# Patient Record
Sex: MALE | Race: WHITE | Employment: OTHER | URBAN - METROPOLITAN AREA
[De-identification: names, ages, dates, MRNs, and addresses within clinical notes are randomized per-mention and may not be internally consistent; named-entity substitution may affect disease eponyms.]

---

## 2020-02-06 NOTE — TELEPHONE ENCOUNTER
DIAGNOSIS: Dupuytren's Contracture, in both feet / appt per pt / all recs [facility]   APPOINTMENT DATE: 3/10/2020   NOTES STATUS DETAILS   OFFICE NOTE from referring provider Care Everywhere 01/31/2020 referral from Prabhjot Najera DO     OFFICE NOTE from other specialist Care Everywhere 08/08/2017 PT notes with Jonathan Tapia, PT     DISCHARGE SUMMARY from hospital N/A    DISCHARGE REPORT from the ER N/A    OPERATIVE REPORT N/A    MEDICATION LIST Care Everywhere    IMPLANT RECORD/STICKER N/A    LABS     CBC/DIFF N/A    CULTURES N/A    INJECTIONS DONE IN RADIOLOGY N/A    MRI N/A    CT SCAN N/A    XRAYS (IMAGES & REPORTS) N/A    TUMOR     PATHOLOGY  Slides & report N/A

## 2020-02-10 ENCOUNTER — DOCUMENTATION ONLY (OUTPATIENT)
Dept: CARE COORDINATION | Facility: CLINIC | Age: 75
End: 2020-02-10

## 2020-03-10 ENCOUNTER — PRE VISIT (OUTPATIENT)
Dept: ORTHOPEDICS | Facility: CLINIC | Age: 75
End: 2020-03-10

## 2020-03-10 ENCOUNTER — OFFICE VISIT (OUTPATIENT)
Dept: ORTHOPEDICS | Facility: CLINIC | Age: 75
End: 2020-03-10
Payer: MEDICARE

## 2020-03-10 ENCOUNTER — ANCILLARY PROCEDURE (OUTPATIENT)
Dept: GENERAL RADIOLOGY | Facility: CLINIC | Age: 75
End: 2020-03-10
Attending: PREVENTIVE MEDICINE
Payer: MEDICARE

## 2020-03-10 VITALS — DIASTOLIC BLOOD PRESSURE: 67 MMHG | SYSTOLIC BLOOD PRESSURE: 114 MMHG | HEART RATE: 65 BPM | OXYGEN SATURATION: 94 %

## 2020-03-10 DIAGNOSIS — M51.369 DDD (DEGENERATIVE DISC DISEASE), LUMBAR: ICD-10-CM

## 2020-03-10 DIAGNOSIS — M79.672 BILATERAL FOOT PAIN: Primary | ICD-10-CM

## 2020-03-10 DIAGNOSIS — M79.671 BILATERAL FOOT PAIN: Primary | ICD-10-CM

## 2020-03-10 DIAGNOSIS — M72.0 DUPUYTREN'S CONTRACTURE OF RIGHT HAND: ICD-10-CM

## 2020-03-10 DIAGNOSIS — M79.672 BILATERAL FOOT PAIN: ICD-10-CM

## 2020-03-10 DIAGNOSIS — M72.2 DUPUYTREN'S CONTRACTURE OF FOOT: ICD-10-CM

## 2020-03-10 DIAGNOSIS — M79.671 BILATERAL FOOT PAIN: ICD-10-CM

## 2020-03-10 RX ORDER — CLOPIDOGREL BISULFATE 75 MG/1
75 TABLET ORAL
COMMUNITY
Start: 2019-07-19

## 2020-03-10 RX ORDER — CYCLOBENZAPRINE HCL 10 MG
10 TABLET ORAL
COMMUNITY
Start: 2019-07-19

## 2020-03-10 RX ORDER — HYDROCODONE BITARTRATE AND ACETAMINOPHEN 5; 325 MG/1; MG/1
1 TABLET ORAL
COMMUNITY
Start: 2020-01-31

## 2020-03-10 RX ORDER — ATORVASTATIN CALCIUM 80 MG/1
80 TABLET, FILM COATED ORAL
COMMUNITY
Start: 2019-07-19

## 2020-03-10 RX ORDER — SODIUM FLUORIDE 6 MG/ML
PASTE, DENTIFRICE DENTAL
COMMUNITY
Start: 2020-02-01

## 2020-03-10 ASSESSMENT — ENCOUNTER SYMPTOMS
ARTHRALGIAS: 1
BACK PAIN: 1
STIFFNESS: 1
MYALGIAS: 1

## 2020-03-10 NOTE — LETTER
3/10/2020       RE: Layo Barboza  [address] [place] [address]     Dear Colleague,    Thank you for referring your patient, Layo Barboza, to the [facility] at [facility]. Please see a copy of my visit note below.    HISTORY OF PRESENT ILLNESS  Mr. Barboza is a pleasant 74 year old year old male who presents to clinic today with bilateral hand and foot contractures. He is concnered about contractures in his right hand, and bilateral feet  Have had for years  Also reports history of lumbar disc herniation  Has back pain regularly as well  And pain and numbness in both feet  Layo explains that he is mostly interested in seeing Dr Melvin for care and possible surgery of right hand but is interested to discuss his low back pain and symptoms in his feet  Location: see above  Quality:  achy pain    Severity: 5/10 at worst    Duration: years  Timing: occurs intermittently  Context: occurs while using his hand and walking   Modifying factors:  resting and non-use makes it better, movement and use makes it worse  Associated signs & symptoms: see above  Additional history: as documented    MEDICAL HISTORY  There is no problem list on file for this patient.      Current Outpatient Medications   Medication Sig Dispense Refill     atorvastatin (LIPITOR) 80 MG tablet Take 80 mg by mouth       clopidogrel (PLAVIX) 75 MG tablet Take 75 mg by mouth       cyclobenzaprine (FLEXERIL) 10 MG tablet Take 10 mg by mouth       HYDROcodone-acetaminophen (NORCO) 5-325 MG tablet Take 1 tablet by mouth       PREVIDENT 5000 BOOSTER PLUS 1.1 % PSTE USE IN PLACE OF REGULAR PASTE HS . BRUSH NORMALLY THEN SWISH WITH FOAM FOR 30 SECONDS. SPIT OUT THEN NO FOOD OR DRINK FOR 30 MINUTES         No Known Allergies    History reviewed. No pertinent family history.  Social History     Socioeconomic History     Marital status:      Spouse name: None     Number of  children: None     Years of education: None     Highest education level: None   Occupational History     None   Social Needs     Financial resource strain: None     Food insecurity     Worry: None     Inability: None     Transportation needs     Medical: None     Non-medical: None   Tobacco Use     Smoking status: Former Smoker     Packs/day: 1.50     Years: 10.00     Pack years: 15.00     Types: Cigarettes, Dip, chew, snus or snuff     Start date: 1/1/1963     Smokeless tobacco: Former User     Quit date: 1/6/2017   Substance and Sexual Activity     Alcohol use: Not Currently     Drug use: Yes     Types: Opiates     Sexual activity: Not Currently   Lifestyle     Physical activity     Days per week: None     Minutes per session: None     Stress: None   Relationships     Social connections     Talks on phone: None     Gets together: None     Attends [employer] service: None     Active member of club or organization: None     Attends meetings of clubs or organizations: None     Relationship status: None     Intimate partner violence     Fear of current or ex partner: None     Emotionally abused: None     Physically abused: None     Forced sexual activity: None   Other Topics Concern     None   Social History Narrative     None       Additional medical/Social/Surgical histories reviewed in [facility] and updated as appropriate.     REVIEW OF SYSTEMS (3/10/2020)  10 point ROS of systems including Constitutional, Eyes, Respiratory, Cardiovascular, Gastroenterology, Genitourinary, Integumentary, Musculoskeletal, Psychiatric, Allergic/Immunologic were all negative except for pertinent positives noted in my HPI.     PHYSICAL EXAM  Vitals:    03/10/20 1536   BP: 114/67   BP Location: Right arm   Patient Position: Sitting   Pulse: 65   SpO2: 94%     Vital Signs: /67 (BP Location: Right arm, Patient Position: Sitting)   Pulse 65   SpO2 94%  Patient declined being weighed. There is no height or weight on file to calculate  BMI.    General  - normal appearance, in no obvious distress  CV  - normal pulses at posterior tib and dorsalis pedis  Pulm  - normal respiratory pattern, non-labored  Musculoskeletal - bilateral foot  - stance: normal gait without limp, normal stance without excessive pronation, normal heel inversion with standing heel raise, no obvious leg length discrepancy, normal heel and toe walk  - inspection: no swelling or effusion,  normal bone and joint alignment, has contracture deformities in bilateral medial foot, plantar surface, appears to be dupuytren's contractures, slightly tender  - palpation: tender at the medial calcaneal tubercle  - ROM: normal active and passive ROM of great and lesser toes, no pain with MT translation  - strength: 5/5 in all planes  Neuro  - no sensory or motor deficit, grossly normal coordination, normal muscle tone  Skin  - no ecchymosis, erythema, warmth, or induration, no obvious rash  Psych  - interactive, appropriate, normal mood and affect  Right hand: has contracture of palmar area, dupuytren's appearing  Lumbar : has pain with flexion and extension of low back and negative SLR  ASSESSMENT & PLAN  75 yo male with bilateral foot contractures, right hand contracture, and lumbar ddd  Reviewed xrays feet: has bilateral pes planus, no arthritis  Reviewed xrays lumbar: shows ddd  Consider MRI of feet and lumbar  Wants to see Dr Melvin for right hand contracture for discussion of surgery  F/u PRN    Again, thank you for allowing me to participate in the care of your patient.      Sincerely,    Pravin Giles MD

## 2020-03-10 NOTE — NURSING NOTE
Reason For Visit:   Chief Complaint   Patient presents with     Consult     Duputren's Contracture in both feet & hands       /67 (BP Location: Right arm, Patient Position: Sitting)   Pulse 65   SpO2 94%     Pain Assessment  Patient Currently in Pain: Yes  0-10 Pain Scale: 2  Primary Pain Location: Foot  Pain Descriptors: Aching(worst in AM)  Alleviating Factors: NSAIDS, Pain medication  Aggravating Factors: Walking    Teodora Funes ATC

## 2020-03-10 NOTE — PROGRESS NOTES
HISTORY OF PRESENT ILLNESS  Mr. Barboza is a pleasant 74 year old year old male who presents to clinic today with bilateral hand and foot contractures. He is concnered about contractures in his right hand, and bilateral feet  Have had for years  Also reports history of lumbar disc herniation  Has back pain regularly as well  And pain and numbness in both feet  Layo explains that he is mostly interested in seeing Dr Melvin for care and possible surgery of right hand but is interested to discuss his low back pain and symptoms in his feet  Location: see above  Quality:  achy pain    Severity: 5/10 at worst    Duration: years  Timing: occurs intermittently  Context: occurs while using his hand and walking   Modifying factors:  resting and non-use makes it better, movement and use makes it worse  Associated signs & symptoms: see above  Additional history: as documented    MEDICAL HISTORY  There is no problem list on file for this patient.      Current Outpatient Medications   Medication Sig Dispense Refill     atorvastatin (LIPITOR) 80 MG tablet Take 80 mg by mouth       clopidogrel (PLAVIX) 75 MG tablet Take 75 mg by mouth       cyclobenzaprine (FLEXERIL) 10 MG tablet Take 10 mg by mouth       HYDROcodone-acetaminophen (NORCO) 5-325 MG tablet Take 1 tablet by mouth       PREVIDENT 5000 BOOSTER PLUS 1.1 % PSTE USE IN PLACE OF REGULAR PASTE HS . BRUSH NORMALLY THEN SWISH WITH FOAM FOR 30 SECONDS. SPIT OUT THEN NO FOOD OR DRINK FOR 30 MINUTES         No Known Allergies    History reviewed. No pertinent family history.  Social History     Socioeconomic History     Marital status:      Spouse name: None     Number of children: None     Years of education: None     Highest education level: None   Occupational History     None   Social Needs     Financial resource strain: None     Food insecurity     Worry: None     Inability: None     Transportation needs     Medical: None     Non-medical: None   Tobacco Use      Smoking status: Former Smoker     Packs/day: 1.50     Years: 10.00     Pack years: 15.00     Types: Cigarettes, Dip, chew, snus or snuff     Start date: 1/1/1963     Smokeless tobacco: Former User     Quit date: 1/6/2017   Substance and Sexual Activity     Alcohol use: Not Currently     Drug use: Yes     Types: Opiates     Sexual activity: Not Currently   Lifestyle     Physical activity     Days per week: None     Minutes per session: None     Stress: None   Relationships     Social connections     Talks on phone: None     Gets together: None     Attends Christian service: None     Active member of club or organization: None     Attends meetings of clubs or organizations: None     Relationship status: None     Intimate partner violence     Fear of current or ex partner: None     Emotionally abused: None     Physically abused: None     Forced sexual activity: None   Other Topics Concern     None   Social History Narrative     None       Additional medical/Social/Surgical histories reviewed in [facility] and updated as appropriate.     REVIEW OF SYSTEMS (3/10/2020)  10 point ROS of systems including Constitutional, Eyes, Respiratory, Cardiovascular, Gastroenterology, Genitourinary, Integumentary, Musculoskeletal, Psychiatric, Allergic/Immunologic were all negative except for pertinent positives noted in my HPI.     PHYSICAL EXAM  Vitals:    03/10/20 1536   BP: 114/67   BP Location: Right arm   Patient Position: Sitting   Pulse: 65   SpO2: 94%     Vital Signs: /67 (BP Location: Right arm, Patient Position: Sitting)   Pulse 65   SpO2 94%  Patient declined being weighed. There is no height or weight on file to calculate BMI.    General  - normal appearance, in no obvious distress  CV  - normal pulses at posterior tib and dorsalis pedis  Pulm  - normal respiratory pattern, non-labored  Musculoskeletal - bilateral foot  - stance: normal gait without limp, normal stance without excessive pronation, normal heel inversion  with standing heel raise, no obvious leg length discrepancy, normal heel and toe walk  - inspection: no swelling or effusion,  normal bone and joint alignment, has contracture deformities in bilateral medial foot, plantar surface, appears to be dupuytren's contractures, slightly tender  - palpation: tender at the medial calcaneal tubercle  - ROM: normal active and passive ROM of great and lesser toes, no pain with MT translation  - strength: 5/5 in all planes  Neuro  - no sensory or motor deficit, grossly normal coordination, normal muscle tone  Skin  - no ecchymosis, erythema, warmth, or induration, no obvious rash  Psych  - interactive, appropriate, normal mood and affect  Right hand: has contracture of palmar area, dupuytren's appearing  Lumbar : has pain with flexion and extension of low back and negative SLR  ASSESSMENT & PLAN  73 yo male with bilateral foot contractures, right hand contracture, and lumbar ddd  Reviewed xrays feet: has bilateral pes planus, no arthritis  Reviewed xrays lumbar: shows ddd  Consider MRI of feet and lumbar  Wants to see Dr Melvin for right hand contracture for discussion of surgery  F/u PRN      Pravin Giles MD, CAQSM

## 2020-03-11 NOTE — TELEPHONE ENCOUNTER
DIAGNOSIS: Dupuytren's contracture of right hand [M72.0], no recent imaging, per Dr. giles   APPOINTMENT DATE: Apr 16, 2020     NOTES STATUS DETAILS   OFFICE NOTE from referring provider Internal 03/10/20 Dr. Giles   OFFICE NOTE from other specialist Care Everywhere 1/31/2020 Prabhjot Najera,       08/08/2017 PT notes with Jonathan Tapia, PT     DISCHARGE SUMMARY from hospital N/A    DISCHARGE REPORT from the ER N/A    OPERATIVE REPORT N/A    MEDICATION LIST Internal    IMPLANT RECORD/STICKER N/A    LABS     CBC/DIFF N/A    CULTURES N/A    INJECTIONS DONE IN RADIOLOGY N/A    MRI N/A    CT SCAN N/A    XRAYS (IMAGES & REPORTS) N/A    TUMOR     PATHOLOGY  Slides & report N/A

## 2020-04-16 ENCOUNTER — PRE VISIT (OUTPATIENT)
Dept: ORTHOPEDICS | Facility: CLINIC | Age: 75
End: 2020-04-16

## 2020-06-04 ENCOUNTER — TELEPHONE (OUTPATIENT)
Dept: ORTHOPEDICS | Facility: CLINIC | Age: 75
End: 2020-06-04

## 2020-06-04 ENCOUNTER — OFFICE VISIT (OUTPATIENT)
Dept: ORTHOPEDICS | Facility: CLINIC | Age: 75
End: 2020-06-04
Attending: PREVENTIVE MEDICINE
Payer: MEDICARE

## 2020-06-04 DIAGNOSIS — M72.0 DUPUYTREN'S CONTRACTURE OF RIGHT HAND: ICD-10-CM

## 2020-06-04 DIAGNOSIS — Z53.9 ERRONEOUS ENCOUNTER--DISREGARD: Primary | ICD-10-CM

## 2020-06-04 NOTE — LETTER
6/4/2020     RE: Layo Barboza  [address] [place] [address]    Dear Colleague,    Thank you for referring your patient, Layo Barboza, to the [facility]. Please see a copy of my visit note below.    HISTORY OF PRESENT ILLNESS  Mr. Barboza is a pleasant 74 year old year old male who presents to clinic today with bilateral hand and foot contractures. He has started to have pain, particularly in the left hand, which has gotten worse.    In 2004, was treated by Dr. Waterman with excision of dups on right 5th finger.        MEDICAL HISTORY  There is no problem list on file for this patient.       Current Outpatient Prescriptions          Current Outpatient Medications   Medication Sig Dispense Refill     atorvastatin (LIPITOR) 80 MG tablet Take 80 mg by mouth         clopidogrel (PLAVIX) 75 MG tablet Take 75 mg by mouth         cyclobenzaprine (FLEXERIL) 10 MG tablet Take 10 mg by mouth         HYDROcodone-acetaminophen (NORCO) 5-325 MG tablet Take 1 tablet by mouth         PREVIDENT 5000 BOOSTER PLUS 1.1 % PSTE USE IN PLACE OF REGULAR PASTE HS . BRUSH NORMALLY THEN SWISH WITH FOAM FOR 30 SECONDS. SPIT OUT THEN NO FOOD OR DRINK FOR 30 MINUTES               No Known Allergies     Family History   History reviewed. No pertinent family history.     Social History     .   Additional medical/Social/Surgical histories reviewed in [employer] and updated as appropriate.     REVIEW OF SYSTEMS (3/10/2020)  10 point ROS of systems including Constitutional, Eyes, Respiratory, Cardiovascular, Gastroenterology, Genitourinary, Integumentary, Musculoskeletal, Psychiatric, Allergic/Immunologic were all negative except for pertinent positives noted in my HPI.     PHYSICAL EXAM       General  - normal appearance, in no obvious distress    Right hand: has contracture of palmar area, dupuytren's appearing  Left hand: First web contracture, painful, 4th web cord and nodule.    ASSESSMENT & PLAN  74  yo male with bilateral hand contractures.  Risks benefits alternatives of treatment options discussed.  Surgical excision, percutaneous aponeurotomy, xiaflex.  Will proceed as patient thinks over options.      Deb Melvin MD   Hand and Upper Extremity Specialist  [facility]

## 2020-06-04 NOTE — NURSING NOTE
Reason For Visit:   Chief Complaint   Patient presents with     Consult     Dupuytren's contracture, Left hand and bilateral foot. Patient stated that he has a hx of having surgery for this on his right hand.         Primary MD: Prabhjot Najera  Ref. MD: Dr. Giles    Age: 75 year old    Pain Assessment  Patient Currently in Pain: Yes  Primary Pain Location: (Right leg)    Hand Dominance Evaluation  Hand Dominance: Left    Pinch force  R hand key force: 6.804 kg (15 lb)  L hand key force: 6.804 kg (15 lb)     force  R hand  level 2 force: 34 kg (75 lb)  L hand  level  2 force: 40.8 kg (90 lb)    QuickDASH Assessment  No flowsheet data found.       Current Outpatient Medications   Medication Sig Dispense Refill     atorvastatin (LIPITOR) 80 MG tablet Take 80 mg by mouth       clopidogrel (PLAVIX) 75 MG tablet Take 75 mg by mouth       cyclobenzaprine (FLEXERIL) 10 MG tablet Take 10 mg by mouth       HYDROcodone-acetaminophen (NORCO) 5-325 MG tablet Take 1 tablet by mouth       PREVIDENT 5000 BOOSTER PLUS 1.1 % PSTE USE IN PLACE OF REGULAR PASTE HS . BRUSH NORMALLY THEN SWISH WITH FOAM FOR 30 SECONDS. SPIT OUT THEN NO FOOD OR DRINK FOR 30 MINUTES         No Known Allergies    Jamila Puga LPN

## 2020-06-15 NOTE — PROGRESS NOTES
HISTORY OF PRESENT ILLNESS  Mr. Barboza is a pleasant 74 year old year old male who presents to clinic today with bilateral hand and foot contractures. He has started to have pain, particularly in the left hand, which has gotten worse.    In 2004, was treated by Dr. Waterman with excision of dups on right 5th finger.        MEDICAL HISTORY  There is no problem list on file for this patient.       Current Outpatient Prescriptions          Current Outpatient Medications   Medication Sig Dispense Refill     atorvastatin (LIPITOR) 80 MG tablet Take 80 mg by mouth         clopidogrel (PLAVIX) 75 MG tablet Take 75 mg by mouth         cyclobenzaprine (FLEXERIL) 10 MG tablet Take 10 mg by mouth         HYDROcodone-acetaminophen (NORCO) 5-325 MG tablet Take 1 tablet by mouth         PREVIDENT 5000 BOOSTER PLUS 1.1 % PSTE USE IN PLACE OF REGULAR PASTE HS . BRUSH NORMALLY THEN SWISH WITH FOAM FOR 30 SECONDS. SPIT OUT THEN NO FOOD OR DRINK FOR 30 MINUTES               No Known Allergies     Family History   History reviewed. No pertinent family history.     Social History     .   Additional medical/Social/Surgical histories reviewed in [facility] and updated as appropriate.     REVIEW OF SYSTEMS (3/10/2020)  10 point ROS of systems including Constitutional, Eyes, Respiratory, Cardiovascular, Gastroenterology, Genitourinary, Integumentary, Musculoskeletal, Psychiatric, Allergic/Immunologic were all negative except for pertinent positives noted in my HPI.     PHYSICAL EXAM       General  - normal appearance, in no obvious distress    Right hand: has contracture of palmar area, dupuytren's appearing  Left hand: First web contracture, painful, 4th web cord and nodule.    ASSESSMENT & PLAN  75 yo male with bilateral hand contractures.  Risks benefits alternatives of treatment options discussed.  Surgical excision, percutaneous aponeurotomy, xiaflex.  Will proceed as patient thinks over options.      Deb Melvin MD   Hand and  Upper Extremity Specialist  [facility]

## 2020-06-18 ENCOUNTER — THERAPY VISIT (OUTPATIENT)
Dept: OCCUPATIONAL THERAPY | Facility: CLINIC | Age: 75
End: 2020-06-18
Payer: MEDICARE

## 2020-06-18 ENCOUNTER — OFFICE VISIT (OUTPATIENT)
Dept: ORTHOPEDICS | Facility: CLINIC | Age: 75
End: 2020-06-18
Payer: MEDICARE

## 2020-06-18 DIAGNOSIS — M72.0 DUPUYTREN'S CONTRACTURE: ICD-10-CM

## 2020-06-18 DIAGNOSIS — M72.0 DUPUYTREN'S CONTRACTURE OF RIGHT HAND: Primary | ICD-10-CM

## 2020-06-18 DIAGNOSIS — M72.2 DUPUYTREN'S CONTRACTURE OF FOOT: Primary | ICD-10-CM

## 2020-06-18 PROCEDURE — 97165 OT EVAL LOW COMPLEX 30 MIN: CPT | Mod: GO | Performed by: OCCUPATIONAL THERAPIST

## 2020-06-18 PROCEDURE — 97110 THERAPEUTIC EXERCISES: CPT | Mod: GO | Performed by: OCCUPATIONAL THERAPIST

## 2020-06-18 PROCEDURE — 97760 ORTHOTIC MGMT&TRAING 1ST ENC: CPT | Mod: GO | Performed by: OCCUPATIONAL THERAPIST

## 2020-06-18 NOTE — PROGRESS NOTES
Deb Melvin MD   Orthopedics        Procedure:  [facility]  Procedure Note     Pre-operative diagnosis: Right small finger and first web dups contracture   Post-operative diagnosis: Same   Procedure: Percutaneous aponeurotomy   Surgeon: Deb Melvin MD   Assistant(s): None   Anesthesia: Local anesthesia   Estimated blood loss: Less than 5 ml   Procedure;  Local anesthesia with 2% lidocaine with epi was injected in the palm.  An 18 gauge needle was used to superficially disrupt the mayers cords until the patient could fully extend his MP joints of thumb and small finger for needle passes on the thumb cord and 2 needle passes on the fifth finger cord.  Patient confirmed improvement in range of motion and satisfactory improvement.                       Findings: Correction of MP contracture.    Complications: None   Condition: Stable   Weight bearing status: Weight bearing as tolerated   Activity: Activity as tolerated  Patient may move about with assist as indicated or with supervision   Orthotic management: Extension splint at night.  Fabricated today.

## 2020-06-18 NOTE — NURSING NOTE
Reason For Visit:   Chief Complaint   Patient presents with     Follow Up     Subcutaneous aponeurotomy with needle, left hand.        Primary MD: Prabhjot Najera    Age: 75 year old      Pain Assessment  Patient Currently in Pain: Denies         Current Outpatient Medications   Medication Sig Dispense Refill     atorvastatin (LIPITOR) 80 MG tablet Take 80 mg by mouth       clopidogrel (PLAVIX) 75 MG tablet Take 75 mg by mouth       cyclobenzaprine (FLEXERIL) 10 MG tablet Take 10 mg by mouth       HYDROcodone-acetaminophen (NORCO) 5-325 MG tablet Take 1 tablet by mouth       PREVIDENT 5000 BOOSTER PLUS 1.1 % PSTE USE IN PLACE OF REGULAR PASTE HS . BRUSH NORMALLY THEN SWISH WITH FOAM FOR 30 SECONDS. SPIT OUT THEN NO FOOD OR DRINK FOR 30 MINUTES         No Known Allergies    Jamila Puga LPN

## 2020-06-18 NOTE — LETTER
6/18/2020     RE: Layo Barboza  [facility] [place] [address]    Dear Colleague,    Thank you for referring your patient, Layo Barboza, to the [facility]. Please see a copy of my visit note below.    Deb Melvin MD   Orthopedics        Procedure:  [facility]  Procedure Note     Pre-operative diagnosis: Right small finger and first web dups contracture   Post-operative diagnosis: Same   Procedure: Percutaneous aponeurotomy   Surgeon: Deb Melvin MD   Assistant(s): None   Anesthesia: Local anesthesia   Estimated blood loss: Less than 5 ml   Procedure;  Local anesthesia with 2% lidocaine with epi was injected in the palm.  An 18 gauge needle was used to superficially disrupt the mayers cords until the patient could fully extend his MP joints of thumb and small finger for needle passes on the thumb cord and 2 needle passes on the fifth finger cord.  Patient confirmed improvement in range of motion and satisfactory improvement.                       Findings: Correction of MP contracture.    Complications: None   Condition: Stable   Weight bearing status: Weight bearing as tolerated   Activity: Activity as tolerated  Patient may move about with assist as indicated or with supervision   Orthotic management: Extension splint at night.  Fabricated today.                  Hand / Upper Extremity Injection/Arthrocentesis: L index finger    Date/Time: 6/18/2020 11:30 AM  Performed by: Deb Melvin MD  Authorized by: Deb Melvin MD     Indications:  Pain  Needle Size:  25 G  Guidance: landmark    Approach:  Volar  Condition: Dupuytren's contracture      Site:  L index finger  Medications:  1 mL lidocaine 1% with EPINEPHrine 1:100,000 1 %-1:[id number]  Procedure discussed: discussed risks, benefits, and alternatives    Consent Given by:  Patient  Timeout: timeout called immediately prior to procedure    Prep: patient was prepped  and draped in usual sterile fashion      Yesenia Rodriguez [facility]  [address] [id number] [phone]  Dept: [phone]  ______________________________________________________________________________  Patient: Layo Barboza   : 1945   MRN: [id number]   2020    INVASIVE PROCEDURE SAFETY CHECKLIST    Date: 2020   Procedure:Dupuytren's aponeurotomy   Patient Name: Layo Barboza  MRN: [id number]  YOB: 1945    Action: Complete sections as appropriate. Any discrepancy results in a HARD COPY until resolved.     PRE PROCEDURE:  Patient ID verified with 2 identifiers (name and  or MRN): Yes  Procedure and site verified with patient/designee (when able): Yes  Accurate consent documentation in medical record: Yes  H&P (or appropriate assessment) documented in medical record: Yes  H&P must be up to 20 days prior to procedure and updates within 24 hours of procedure as applicable: Yes  Relevant diagnostic and radiology test results appropriately labeled and displayed as applicable: Yes  Procedure site(s) marked with provider initials: Yes    TIMEOUT:  Time-Out performed immediately prior to starting procedure, including verbal and active participation of all team members addressing the following:Yes  * Correct patient identify  * Confirmed that the correct side and site are marked  * An accurate procedure consent form  * Agreement on the procedure to be done  * Correct patient position  * Relevant images and results are properly labeled and appropriately displayed  * The need to administer antibiotics or fluids for irrigation purposes during the procedure as applicable   * Safety precautions based on patient history or medication use    DURING PROCEDURE: Verification of correct person, site, and procedures any time the responsibility for care of the patient is transferred to another member of the care team.        The following medications were given:     Prior to injection, verified patient identity using patient's name and date of birth.  Due to injection administration, patient instructed to remain in clinic for 15 minutes  afterwards, and to report any adverse reaction to me immediately.    Aponuerotomy  Medication Name:Lidocaine with Epi 1% NDC 5622-4166-16  Drug Amount Wasted:  Yes: 20 mg/ml   Vial/Syringe: Multi dose vial  Expiration Date:  9/1/2020      Scribed by Yesenia Rodriguez ATC for Dr. Melvin on June 18, 2020 at 12:54 pm based on the provider's statements to me.   ANGELA Lorenzo Ann Elizabeth, MD   Orthopedics        Procedure:  [facility]  Procedure Note     Pre-operative diagnosis: Right small finger and first web dups contracture   Post-operative diagnosis: Same   Procedure: Percutaneous aponeurotomy   Surgeon: Deb Melvin MD   Assistant(s): None   Anesthesia: Local anesthesia   Estimated blood loss: Less than 5 ml   Procedure;  Local anesthesia with 2% lidocaine with epi was injected in the palm.  An 18 gauge needle was used to superficially disrupt the mayers cords until the patient could fully extend his MP joints of thumb and small finger for needle passes on the thumb cord and 2 needle passes on the fifth finger cord.  Patient confirmed improvement in range of motion and satisfactory improvement.                       Findings: Correction of MP contracture.    Complications: None   Condition: Stable   Weight bearing status: Weight bearing as tolerated   Activity: Activity as tolerated  Patient may move about with assist as indicated or with supervision   Orthotic management: Extension splint at night.  Fabricated today.                  Again, thank you for allowing me to participate in the care of your patient.      Sincerely,    Deb Melvin MD

## 2020-06-18 NOTE — LETTER
DEPARTMENT OF HEALTH AND HUMAN SERVICES  CENTERS FOR MEDICARE & MEDICAID SERVICES    PLAN/UPDATED PLAN OF PROGRESS FOR OUTPATIENT REHABILITATION    PATIENTS NAME:  Layo Barboza   : 1945  PROVIDER NUMBER:    [id number]  [id number]   PROVIDER NAME:  [facility]  MEDICAL RECORD NUMBER: [id number]   START OF CARE DATE:  SOC Date: 20   TYPE:  PT  PRIMARY/TREATMENT DIAGNOSIS: (Pertinent Medical Diagnosis)  Dupuytren's contracture  VISITS FROM START OF CARE:  Rxs Used: 1     Hand Therapy Initial Evaluation    Current Date:  2020    Diagnosis: right hand aponeurotomy secondary to dupuytren's contracture  DOS: 20  Procedure:  aponeurotomy  Post:  0    Precautions: NA    Subjective:  Layo Barboza is a 75 year old male.    Patient reports symptoms of the left hand which occurred due to aponeurotomy s/p dupuytren's. Since onset symptoms are Gradually getting better.  Special tests:  none.  Previous treatment: aponeurotomy.    General health as reported by patient is good.  Pertinent medical history includes:No past medical history on file.  Medical allergies:none.  Surgical history:   Past Surgical History:   Procedure Laterality Date     C HAND/FINGER SURGERY UNLISTED       HC VASCULAR SURGERY PROCEDURE UNLIST       KNEE SURGERY       Medication history: see chart.    Current occupation is retired    Occupational Profile Information:  Left hand dominant  Prior functional level:  no limitations  Patient reports symptoms of pain, stiffness/loss of motion, weakness/loss of strength and edema  Special tests:  none.      Barriers include:none  Mobility: No difficulty  Transportation: drives  Leisure activities/hobbies: weight lifting    Objective:  Pain Level (Scale 0-10)  PATIENTS NAME:  Layo Barboza   : 1945   At Rest 0/10   With Use 3/10     Pain Description  Date 2020   Location L hand   Pain Quality Aching   Frequency intermittent     Pain is worst   daytime   Exacerbated by  use   Relieved by rest   Progression improving     Edema  Mild    Scar   open    Sensation  Decreased due to block    ROM  HAND 2020   AROM(PROM) L   Small MP 0/80   PIP -22/90   DIP 0/67   BARRETT 215     ROM  Thumb 2020   AROM  (PROM) L   MP +/62   IP +/62   RABD 65   PABD 56     Strength: Contraindicated    Assessment:  Patient presents with symptoms consistent with diagnosis of right hand Dupuytren's contracture, with surgical  intervention.     Patient's limitations or Problem List includes:  Pain, Decreased ROM/motion and Increased edema of the left hand which interferes with the patient's ability to perform Self Care Tasks (dressing, eating, bathing, hygiene/toileting), Work Tasks, Sleep Patterns, Recreational Activities, Household Chores and Driving  as compared to previous level of function.  Rehab Potential:  Good - Return to full activity, some limitations  Patient will benefit from skilled Occupational Therapy to increase ROM and decrease pain, edema and adherence of scarring to return to previous activity level and resume normal daily tasks and to reach their rehab potential.  Barriers to Learning:  No barrier  PATIENTS NAME:  BambintLayo jim   : 1945    Communication Issues:  Patient appears to be able to clearly communicate and understand verbal and written communication and follow directions correctly.  Chart Review: Simple history review with patient  Identified Performance Deficits: bathing/showering, toileting, dressing, feeding and leisure activities    Assessment of Occupational Performance:  3-5 Performance Deficits  Clinical Decision Making (Complexity): Low complexity  Treatment Explanation:  The following has been discussed with the patient:    RX ordered/plan of care  Anticipated outcomes  Possible risks and side effects    Plan:  Frequency:  1 X week, once daily  Duration:  for 2 weeks tapering to 2 X a month over 4 weeks  Treatment Plan:   "  Modalities:    US   Therapeutic Exercise:    AROM, AAROM, PROM, Tendon Gliding and Blocking  Therapeutic Activities:   Functional activities   Manual Techniques:   Scar mobilization and Myofascial release  Orthotic Fabrication:    Static orthosis  Self Care:    Self Care Tasks  Discharge Plan:    Achieve all LTG.  Independent in home treatment program.  Reach maximal therapeutic benefit.  Home Exercise Program:  Hand based orthosis for small finger extension and thumb abduction  Finger extension  Thumb AROM  Next Visit:  Orthosis modifications  A/AA/PROM  Scar mgmt    Caregiver Signature/Credentials _____________________________ Date ________         Sussy Swanson OTR/L, CHT  I have reviewed and certified the need for these services and plan of treatment while under my care.        PHYSICIAN'S SIGNATURE:   ____________________________________  Date___________    Deb Melvin MD  Certification period:  Beginning of Cert date period: 06/18/20 to  End of Cert period date: 09/15/20     Functional Level Progress Report: Please see attached \"Goal Flow sheet for Functional level.\"    ____X____ Continue Services or       ________ DC Services                Service dates: From  SOC Date: 06/18/20 date to present                         "

## 2020-06-18 NOTE — PROGRESS NOTES
Hand Therapy Initial Evaluation    Current Date:  6/18/2020    Diagnosis: right hand aponeurotomy secondary to dupuytren's contracture  DOS: 06/18/20  Procedure:  aponeurotomy  Post:  0    Precautions: NA    Subjective:  Layo Barboza is a 75 year old male.    Patient reports symptoms of the left hand which occurred due to aponeurotomy s/p dupuytren's. Since onset symptoms are Gradually getting better.  Special tests:  none.  Previous treatment: aponeurotomy.    General health as reported by patient is good.  Pertinent medical history includes:No past medical history on file.  Medical allergies:none.  Surgical history:   Past Surgical History:   Procedure Laterality Date     C HAND/FINGER SURGERY UNLISTED       HC VASCULAR SURGERY PROCEDURE UNLIST       KNEE SURGERY       Medication history: see chart.    Current occupation is retired    Occupational Profile Information:  Left hand dominant  Prior functional level:  no limitations  Patient reports symptoms of pain, stiffness/loss of motion, weakness/loss of strength and edema  Special tests:  none.      Barriers include:none  Mobility: No difficulty  Transportation: drives  Leisure activities/hobbies: weight lifting    Objective:  Pain Level (Scale 0-10)   6/18/2020   At Rest 0/10   With Use 3/10     Pain Description  Date 6/18/2020   Location L hand   Pain Quality Aching   Frequency intermittent     Pain is worst  daytime   Exacerbated by  use   Relieved by rest   Progression improving     Edema  Mild    Scar   open    Sensation  Decreased due to block    ROM  HAND 6/18/2020   AROM(PROM) L   Small MP 0/80   PIP -22/90   DIP 0/67   BARRETT 215     ROM  Thumb 6/18/2020   AROM  (PROM) L   MP +/62   IP +/62   RABD 65   PABD 56     Strength: Contraindicated    Assessment:  Patient presents with symptoms consistent with diagnosis of right hand Dupuytren's contracture, with surgical  intervention.     Patient's limitations or Problem List includes:  Pain, Decreased  ROM/motion and Increased edema of the left hand which interferes with the patient's ability to perform Self Care Tasks (dressing, eating, bathing, hygiene/toileting), Work Tasks, Sleep Patterns, Recreational Activities, Household Chores and Driving  as compared to previous level of function.    Rehab Potential:  Good - Return to full activity, some limitations    Patient will benefit from skilled Occupational Therapy to increase ROM and decrease pain, edema and adherence of scarring to return to previous activity level and resume normal daily tasks and to reach their rehab potential.    Barriers to Learning:  No barrier    Communication Issues:  Patient appears to be able to clearly communicate and understand verbal and written communication and follow directions correctly.    Chart Review: Simple history review with patient    Identified Performance Deficits: bathing/showering, toileting, dressing, feeding and leisure activities    Assessment of Occupational Performance:  3-5 Performance Deficits    Clinical Decision Making (Complexity): Low complexity    Treatment Explanation:  The following has been discussed with the patient:    RX ordered/plan of care  Anticipated outcomes  Possible risks and side effects    Plan:  Frequency:  1 X week, once daily  Duration:  for 2 weeks tapering to 2 X a month over 4 weeks    Treatment Plan:    Modalities:    US   Therapeutic Exercise:    AROM, AAROM, PROM, Tendon Gliding and Blocking  Therapeutic Activities:   Functional activities   Manual Techniques:   Scar mobilization and Myofascial release  Orthotic Fabrication:    Static orthosis  Self Care:    Self Care Tasks    Discharge Plan:    Achieve all LTG.  Independent in home treatment program.  Reach maximal therapeutic benefit.    Home Exercise Program:  Hand based orthosis for small finger extension and thumb abduction  Finger extension  Thumb AROM    Next Visit:  Orthosis modifications  A/AA/PROM  Scar mgmt

## 2020-06-18 NOTE — PROGRESS NOTES
Deb Melvin MD   Orthopedics        Procedure:  [facility] Orth[facility]  Procedure Note     Pre-operative diagnosis: Right small finger and first web dups contracture   Post-operative diagnosis: Same   Procedure: Percutaneous aponeurotomy   Surgeon: Deb Melvin MD   Assistant(s): None   Anesthesia: Local anesthesia   Estimated blood loss: Less than 5 ml   Procedure;  Local anesthesia with 2% lidocaine with epi was injected in the palm.  An 18 gauge needle was used to superficially disrupt the mayers cords until the patient could fully extend his MP joints of thumb and small finger for needle passes on the thumb cord and 2 needle passes on the fifth finger cord.  Patient confirmed improvement in range of motion and satisfactory improvement.                       Findings: Correction of MP contracture.    Complications: None   Condition: Stable   Weight bearing status: Weight bearing as tolerated   Activity: Activity as tolerated  Patient may move about with assist as indicated or with supervision   Orthotic management: Extension splint at night.  Fabricated today.                  Hand / Upper Extremity Injection/Arthrocentesis: L index finger    Date/Time: 6/18/2020 11:30 AM  Performed by: Deb Melvin MD  Authorized by: Deb Melvin MD     Indications:  Pain  Needle Size:  25 G  Guidance: landmark    Approach:  Volar  Condition: Dupuytren's contracture      Site:  L index finger  Medications:  1 mL lidocaine 1% with EPINEPHrine 1:100,000 1 %-1:164919  Procedure discussed: discussed risks, benefits, and alternatives    Consent Given by:  Patient  Timeout: timeout called immediately prior to procedure    Prep: patient was prepped and draped in usual sterile fashion      Yesenia Rodriguez [facility]  [address] [place] [id number] [phone]  Dept:  [phone]  ______________________________________________________________________________    Patient: Layo Barboza   : 1945   MRN: [id number]   2020    INVASIVE PROCEDURE SAFETY CHECKLIST    Date: 2020   Procedure:Dupuytren's aponeurotomy   Patient Name: Layo Barboza  MRN: [id number]  YOB: 1945    Action: Complete sections as appropriate. Any discrepancy results in a HARD COPY until resolved.     PRE PROCEDURE:  Patient ID verified with 2 identifiers (name and  or MRN): Yes  Procedure and site verified with patient/designee (when able): Yes  Accurate consent documentation in medical record: Yes  H&P (or appropriate assessment) documented in medical record: Yes  H&P must be up to 20 days prior to procedure and updates within 24 hours of procedure as applicable: Yes  Relevant diagnostic and radiology test results appropriately labeled and displayed as applicable: Yes  Procedure site(s) marked with provider initials: Yes    TIMEOUT:  Time-Out performed immediately prior to starting procedure, including verbal and active participation of all team members addressing the following:Yes  * Correct patient identify  * Confirmed that the correct side and site are marked  * An accurate procedure consent form  * Agreement on the procedure to be done  * Correct patient position  * Relevant images and results are properly labeled and appropriately displayed  * The need to administer antibiotics or fluids for irrigation purposes during the procedure as applicable   * Safety precautions based on patient history or medication use    DURING PROCEDURE: Verification of correct person, site, and procedures any time the responsibility for care of the patient is transferred to another member of the care team.       The following medications were given:         Prior to injection, verified patient identity using patient's name and date of birth.  Due to injection administration,  patient instructed to remain in clinic for 15 minutes  afterwards, and to report any adverse reaction to me immediately.    Aponuerotomy  Medication Name:Lidocaine with Epi 1% NDC 5051-0714-05  Drug Amount Wasted:  Yes: 20 mg/ml   Vial/Syringe: Multi dose vial  Expiration Date:  9/1/2020      Scribed by Yesenia Rodriguez ATC for Dr. Melvin on June 18, 2020 at 12:54 pm based on the provider's statements to me.     Yesenia Rodriguez ATC

## 2020-06-26 ENCOUNTER — THERAPY VISIT (OUTPATIENT)
Dept: OCCUPATIONAL THERAPY | Facility: CLINIC | Age: 75
End: 2020-06-26
Payer: MEDICARE

## 2020-06-26 DIAGNOSIS — M72.0 DUPUYTREN'S CONTRACTURE: Primary | ICD-10-CM

## 2020-06-26 PROCEDURE — 97110 THERAPEUTIC EXERCISES: CPT | Mod: GO | Performed by: OCCUPATIONAL THERAPIST

## 2020-06-26 NOTE — PROGRESS NOTES
SOAP note objective information for 6/26/2020.  Please refer to the daily flowsheet for treatment today, total treatment time and time spent performing 1:1 timed codes.     Diagnosis: right hand aponeurotomy secondary to dupuytren's contracture  DOS: 06/18/20  Procedure:  aponeurotomy  Post:  1w 1d      Pain Level (Scale 0-10)   6/18/2020 6/26/2020   At Rest 0/10 0/10   With Use 3/10 2/10 at worst     Pain Description  Date 6/18/2020   Location L hand   Pain Quality Aching   Frequency intermittent     Pain is worst  daytime   Exacerbated by  use   Relieved by rest   Progression improving     Edema  Mild    Scar   open    Sensation  Decreased due to block    ROM  HAND 6/18/2020 6/26/2020   AROM(PROM) L L   Small MP 0/80 HE/90   PIP -22/90 -15/95   DIP 0/67 0/80   BARRETT 215      ROM  Thumb 6/18/2020 6/26/2020   AROM  (PROM) L L   MP +/62 NT   IP +/62 NT   RABD 65 60   PABD 56 60       Home Exercise Program:  Hand based orthosis for small finger extension and thumb abduction  Finger extension  Thumb AROM  Tracking  Thumb webspace stretch  Scar massage    Next Visit:  Orthosis modifications  A/AA/PROM  Scar mgmt

## 2020-08-06 ENCOUNTER — OFFICE VISIT (OUTPATIENT)
Dept: ORTHOPEDICS | Facility: CLINIC | Age: 75
End: 2020-08-06
Payer: MEDICARE

## 2020-08-06 DIAGNOSIS — M72.0 DUPUYTREN'S CONTRACTURE OF RIGHT HAND: Primary | ICD-10-CM

## 2020-08-06 NOTE — PROGRESS NOTES
Date of Service: Aug 6, 2020    Reason for visit: Post-procedure follow-up of  Left small finger and first web dupuytren contracture percutaneous aponeurotomy on 6/18/20    Interval events: Layo Barboza is a 74 yo left-handed male who comes to see us for 6 week follow-up from the above surgery performed 6/18/20.    Patient reports no pain since surgery although he endorses soreness in the 1st web space which he attributes to his DIY project of building a patio. He reports greater reach with his thumb post-op and little change in little finger ROM which was the expected outcome for surgery.     Patient denies any new numbness or tingling. No other concerns at this time.    Physical examination:  The patient is well-developed, well nourished and in on acute distress. The patient is alert and oriented to the surroundings.     Examination of the left hand demonstrates broken up Dupuytren contracture of the 1st web space cord and 5th palmar cord. Sensation is intact throughout. Patient able to oppose thumb to base of 5th digit as well as each  finger, abduct and adduct thumb with full ROM.    Fingers appear well-perfused with good capillary refill    Assessment:   Layo Barboza is a 74 yo left-handed male s/p left small finger and 1st web percutaneous aponeurotomy for Dupuytren's contraction. He reports functional improvement and ROM with his thumb. No concerns at this time.    Plan:    - Follow-up if issues arise, no further follow-up necessary at this time.      Reymundo Moe, MS4  [facility]  08/06/20 '    I have personally examined this patient and have reviewed the clinical presentation and progress note with the resident. I agree with the treatment plan as outlined. The plan was formulated with the resident on the day of the resident's dictation.   Deb Melvin MD   Hand and Upper Extremity Specialist  [facility]

## 2020-08-06 NOTE — NURSING NOTE
Reason For Visit:   Chief Complaint   Patient presents with     RECHECK     Left dupuytren's percutaneous aponeurotomy follow up       Primary MD: Prabhjot Najera    Age: 75 year old    ?  No      There were no vitals taken for this visit.      Pain Assessment  Patient Currently in Pain: Yes  0-10 Pain Scale: 1  Primary Pain Location: Hand(Left)               QuickDASH Assessment  No flowsheet data found.       Current Outpatient Medications   Medication Sig Dispense Refill     atorvastatin (LIPITOR) 80 MG tablet Take 80 mg by mouth       clopidogrel (PLAVIX) 75 MG tablet Take 75 mg by mouth       cyclobenzaprine (FLEXERIL) 10 MG tablet Take 10 mg by mouth       PREVIDENT 5000 BOOSTER PLUS 1.1 % PSTE USE IN PLACE OF REGULAR PASTE HS . BRUSH NORMALLY THEN SWISH WITH FOAM FOR 30 SECONDS. SPIT OUT THEN NO FOOD OR DRINK FOR 30 MINUTES       HYDROcodone-acetaminophen (NORCO) 5-325 MG tablet Take 1 tablet by mouth         No Known Allergies    Muan Das, ATC

## 2020-08-06 NOTE — LETTER
8/6/2020         RE: Layo Barboza  [address] [place] [address]        Dear Colleague,    Thank you for referring your patient, Layo Barboza, to the [facility]. Please see a copy of my visit note below.    Date of Service: Aug 6, 2020    Reason for visit: Post-procedure follow-up of  Left small finger and first web dupuytren contracture percutaneous aponeurotomy on 6/18/20    Interval events: Layo Barboza is a 76 yo left-handed male who comes to see us for 6 week follow-up from the above surgery performed 6/18/20.    Patient reports no pain since surgery although he endorses soreness in the 1st web space which he attributes to his DIY project of building a patio. He reports greater reach with his thumb post-op and little change in little finger ROM which was the expected outcome for surgery.     Patient denies any new numbness or tingling. No other concerns at this time.    Physical examination:  The patient is well-developed, well nourished and in on acute distress. The patient is alert and oriented to the surroundings.     Examination of the left hand demonstrates broken up Dupuytren contracture of the 1st web space cord and 5th palmar cord. Sensation is intact throughout. Patient able to oppose thumb to base of 5th digit as well as each  finger, abduct and adduct thumb with full ROM.    Fingers appear well-perfused with good capillary refill    Assessment:   Layo Barboza is a 76 yo left-handed male s/p left small finger and 1st web percutaneous aponeurotomy for Dupuytren's contraction. He reports functional improvement and ROM with his thumb. No concerns at this time.    Plan:    - Follow-up if issues arise, no further follow-up necessary at this time.      Reymundo Moe, MS4  [facility]  08/06/20 '    I have personally examined this patient and have reviewed the clinical presentation and progress note with the resident. I agree  with the treatment plan as outlined. The plan was formulated with the resident on the day of the resident's dictation.   Deb Melvin MD   Hand and Upper Extremity Specialist  [facility]

## 2020-08-08 ENCOUNTER — ANCILLARY PROCEDURE (OUTPATIENT)
Dept: MRI IMAGING | Facility: CLINIC | Age: 75
End: 2020-08-08
Attending: INTERNAL MEDICINE
Payer: MEDICARE

## 2020-08-08 DIAGNOSIS — M54.16 LUMBAR RADICULOPATHY: ICD-10-CM

## 2020-09-03 PROBLEM — M72.0 DUPUYTREN'S CONTRACTURE: Status: RESOLVED | Noted: 2020-06-18 | Resolved: 2020-09-03

## (undated) RX ORDER — LIDOCAINE HYDROCHLORIDE AND EPINEPHRINE 10; 10 MG/ML; UG/ML
INJECTION, SOLUTION INFILTRATION; PERINEURAL
Status: DISPENSED
Start: 2020-06-18